# Patient Record
Sex: FEMALE | Race: WHITE | Employment: OTHER | URBAN - METROPOLITAN AREA
[De-identification: names, ages, dates, MRNs, and addresses within clinical notes are randomized per-mention and may not be internally consistent; named-entity substitution may affect disease eponyms.]

---

## 2017-04-03 PROBLEM — I48.0 PAROXYSMAL ATRIAL FIBRILLATION (HCC): Status: ACTIVE | Noted: 2017-04-03

## 2017-11-20 PROBLEM — E66.9 OBESITY (BMI 30.0-34.9): Status: ACTIVE | Noted: 2017-11-20

## 2018-08-18 ENCOUNTER — APPOINTMENT (OUTPATIENT)
Dept: GENERAL RADIOLOGY | Facility: HOSPITAL | Age: 78
DRG: 470 | End: 2018-08-18
Attending: EMERGENCY MEDICINE
Payer: MEDICARE

## 2018-08-18 ENCOUNTER — HOSPITAL ENCOUNTER (INPATIENT)
Facility: HOSPITAL | Age: 78
LOS: 3 days | Discharge: NURSING, REHAB OR LONG-TERM CARE | DRG: 470 | End: 2018-08-21
Attending: EMERGENCY MEDICINE | Admitting: HOSPITALIST
Payer: MEDICARE

## 2018-08-18 DIAGNOSIS — S72.001A CLOSED FRACTURE OF RIGHT HIP, INITIAL ENCOUNTER (HCC): Primary | ICD-10-CM

## 2018-08-18 DIAGNOSIS — S72.009A HIP FRACTURE (HCC): ICD-10-CM

## 2018-08-18 LAB
ALBUMIN SERPL-MCNC: 3.7 G/DL (ref 3.5–4.8)
ALBUMIN/GLOB SERPL: 1.2 {RATIO} (ref 1–2)
ALP LIVER SERPL-CCNC: 59 U/L (ref 55–142)
ALT SERPL-CCNC: 25 U/L (ref 14–54)
ANION GAP SERPL CALC-SCNC: 8 MMOL/L (ref 0–18)
ANTIBODY SCREEN: NEGATIVE
APTT PPP: 32 SECONDS (ref 26.1–34.6)
AST SERPL-CCNC: 30 U/L (ref 15–41)
BASOPHILS # BLD AUTO: 0.01 X10(3) UL (ref 0–0.1)
BASOPHILS NFR BLD AUTO: 0.1 %
BILIRUB SERPL-MCNC: 0.6 MG/DL (ref 0.1–2)
BUN BLD-MCNC: 16 MG/DL (ref 8–20)
BUN/CREAT SERPL: 16.5 (ref 10–20)
CALCIUM BLD-MCNC: 9.4 MG/DL (ref 8.3–10.3)
CHLORIDE SERPL-SCNC: 104 MMOL/L (ref 101–111)
CO2 SERPL-SCNC: 31 MMOL/L (ref 22–32)
CREAT BLD-MCNC: 0.97 MG/DL (ref 0.55–1.02)
EOSINOPHIL # BLD AUTO: 0.05 X10(3) UL (ref 0–0.3)
EOSINOPHIL NFR BLD AUTO: 0.6 %
ERYTHROCYTE [DISTWIDTH] IN BLOOD BY AUTOMATED COUNT: 12.7 % (ref 11.5–16)
GLOBULIN PLAS-MCNC: 3.1 G/DL (ref 2.5–4)
GLUCOSE BLD-MCNC: 158 MG/DL (ref 70–99)
HCT VFR BLD AUTO: 43.9 % (ref 34–50)
HGB BLD-MCNC: 14.5 G/DL (ref 12–16)
IMMATURE GRANULOCYTE COUNT: 0.06 X10(3) UL (ref 0–1)
IMMATURE GRANULOCYTE RATIO %: 0.7 %
INR BLD: 2.33 (ref 0.9–1.1)
LYMPHOCYTES # BLD AUTO: 2.65 X10(3) UL (ref 0.9–4)
LYMPHOCYTES NFR BLD AUTO: 29.9 %
M PROTEIN MFR SERPL ELPH: 6.8 G/DL (ref 6.1–8.3)
MCH RBC QN AUTO: 29.9 PG (ref 27–33.2)
MCHC RBC AUTO-ENTMCNC: 33 G/DL (ref 31–37)
MCV RBC AUTO: 90.5 FL (ref 81–100)
MONOCYTES # BLD AUTO: 0.59 X10(3) UL (ref 0.1–1)
MONOCYTES NFR BLD AUTO: 6.7 %
NEUTROPHIL ABS PRELIM: 5.49 X10 (3) UL (ref 1.3–6.7)
NEUTROPHILS # BLD AUTO: 5.49 X10(3) UL (ref 1.3–6.7)
NEUTROPHILS NFR BLD AUTO: 62 %
OSMOLALITY SERPL CALC.SUM OF ELEC: 300 MOSM/KG (ref 275–295)
PLATELET # BLD AUTO: 188 10(3)UL (ref 150–450)
POTASSIUM SERPL-SCNC: 3.5 MMOL/L (ref 3.6–5.1)
PSA SERPL DL<=0.01 NG/ML-MCNC: 26.3 SECONDS (ref 12.4–14.7)
RBC # BLD AUTO: 4.85 X10(6)UL (ref 3.8–5.1)
RED CELL DISTRIBUTION WIDTH-SD: 41.6 FL (ref 35.1–46.3)
RH BLOOD TYPE: NEGATIVE
SODIUM SERPL-SCNC: 143 MMOL/L (ref 136–144)
WBC # BLD AUTO: 8.9 X10(3) UL (ref 4–13)

## 2018-08-18 PROCEDURE — 93010 ELECTROCARDIOGRAM REPORT: CPT | Performed by: INTERNAL MEDICINE

## 2018-08-18 PROCEDURE — 80053 COMPREHEN METABOLIC PANEL: CPT | Performed by: EMERGENCY MEDICINE

## 2018-08-18 PROCEDURE — 85730 THROMBOPLASTIN TIME PARTIAL: CPT | Performed by: EMERGENCY MEDICINE

## 2018-08-18 PROCEDURE — 86901 BLOOD TYPING SEROLOGIC RH(D): CPT | Performed by: ORTHOPAEDIC SURGERY

## 2018-08-18 PROCEDURE — 96375 TX/PRO/DX INJ NEW DRUG ADDON: CPT

## 2018-08-18 PROCEDURE — 87641 MR-STAPH DNA AMP PROBE: CPT | Performed by: EMERGENCY MEDICINE

## 2018-08-18 PROCEDURE — 93005 ELECTROCARDIOGRAM TRACING: CPT

## 2018-08-18 PROCEDURE — 99285 EMERGENCY DEPT VISIT HI MDM: CPT

## 2018-08-18 PROCEDURE — 96374 THER/PROPH/DIAG INJ IV PUSH: CPT

## 2018-08-18 PROCEDURE — 71045 X-RAY EXAM CHEST 1 VIEW: CPT | Performed by: EMERGENCY MEDICINE

## 2018-08-18 PROCEDURE — 87640 STAPH A DNA AMP PROBE: CPT | Performed by: EMERGENCY MEDICINE

## 2018-08-18 PROCEDURE — 86900 BLOOD TYPING SEROLOGIC ABO: CPT | Performed by: ORTHOPAEDIC SURGERY

## 2018-08-18 PROCEDURE — 73502 X-RAY EXAM HIP UNI 2-3 VIEWS: CPT | Performed by: EMERGENCY MEDICINE

## 2018-08-18 PROCEDURE — 85025 COMPLETE CBC W/AUTO DIFF WBC: CPT | Performed by: EMERGENCY MEDICINE

## 2018-08-18 PROCEDURE — 85610 PROTHROMBIN TIME: CPT | Performed by: EMERGENCY MEDICINE

## 2018-08-18 PROCEDURE — 86850 RBC ANTIBODY SCREEN: CPT | Performed by: ORTHOPAEDIC SURGERY

## 2018-08-18 RX ORDER — MORPHINE SULFATE 4 MG/ML
2 INJECTION, SOLUTION INTRAMUSCULAR; INTRAVENOUS EVERY 2 HOUR PRN
Status: DISCONTINUED | OUTPATIENT
Start: 2018-08-18 | End: 2018-08-19

## 2018-08-18 RX ORDER — MORPHINE SULFATE 4 MG/ML
4 INJECTION, SOLUTION INTRAMUSCULAR; INTRAVENOUS EVERY 2 HOUR PRN
Status: DISCONTINUED | OUTPATIENT
Start: 2018-08-18 | End: 2018-08-19

## 2018-08-18 RX ORDER — SODIUM CHLORIDE 9 MG/ML
INJECTION, SOLUTION INTRAVENOUS CONTINUOUS
Status: DISCONTINUED | OUTPATIENT
Start: 2018-08-18 | End: 2018-08-21

## 2018-08-18 RX ORDER — MORPHINE SULFATE 4 MG/ML
4 INJECTION, SOLUTION INTRAMUSCULAR; INTRAVENOUS EVERY 30 MIN PRN
Status: DISCONTINUED | OUTPATIENT
Start: 2018-08-18 | End: 2018-08-19

## 2018-08-18 RX ORDER — ONDANSETRON 2 MG/ML
4 INJECTION INTRAMUSCULAR; INTRAVENOUS ONCE
Status: COMPLETED | OUTPATIENT
Start: 2018-08-18 | End: 2018-08-18

## 2018-08-18 RX ORDER — HYDROCHLOROTHIAZIDE 12.5 MG/1
12.5 CAPSULE, GELATIN COATED ORAL DAILY
Status: DISCONTINUED | OUTPATIENT
Start: 2018-08-19 | End: 2018-08-21

## 2018-08-18 RX ORDER — FLECAINIDE ACETATE 100 MG/1
100 TABLET ORAL 2 TIMES DAILY
Status: DISCONTINUED | OUTPATIENT
Start: 2018-08-18 | End: 2018-08-21

## 2018-08-18 RX ORDER — ATORVASTATIN CALCIUM 10 MG/1
10 TABLET, FILM COATED ORAL NIGHTLY
Status: DISCONTINUED | OUTPATIENT
Start: 2018-08-18 | End: 2018-08-21

## 2018-08-18 RX ORDER — LISINOPRIL 20 MG/1
20 TABLET ORAL DAILY
Status: DISCONTINUED | OUTPATIENT
Start: 2018-08-19 | End: 2018-08-21

## 2018-08-18 NOTE — ED PROVIDER NOTES
Patient Seen in: [facility] Emergency Department    History   Patient presents with:  Fall (musculoskeletal, neurologic)    Stated Complaint: fall; right hip injury    HPI    76-year-old female presents emergency department who states slipped and fe Exam    Vital signs reviewed  General appearance: Patient is alert and in moderate degree of pain  HEENT: Pupils equal react to light extraocular muscles intact no scleral icterus, mucous membranes are moist, there is no erythema or exudate in the posterio be given pain medication. Xr Chest Ap Portable  (cpt=71045)    Result Date: 8/18/2018  CONCLUSION:  No acute process.      Dictated by: Katey Turner MD on 8/18/2018 at 17:41     Approved by: Katey Turner MD            Xr Hip W Or Wo Pelvis 2 Or 3 Views

## 2018-08-18 NOTE — ED INITIAL ASSESSMENT (HPI)
Pt to ED via EMS s/p slipping on wet floor and falling. Pt c/o pain to right hip. Denies any other symptoms. Given intranasal fentanyl en route without relief.

## 2018-08-19 ENCOUNTER — APPOINTMENT (OUTPATIENT)
Dept: GENERAL RADIOLOGY | Facility: HOSPITAL | Age: 78
DRG: 470 | End: 2018-08-19
Attending: PHYSICIAN ASSISTANT
Payer: MEDICARE

## 2018-08-19 ENCOUNTER — ANESTHESIA (OUTPATIENT)
Dept: SURGERY | Facility: HOSPITAL | Age: 78
DRG: 470 | End: 2018-08-19
Payer: MEDICARE

## 2018-08-19 ENCOUNTER — ANESTHESIA EVENT (OUTPATIENT)
Dept: SURGERY | Facility: HOSPITAL | Age: 78
DRG: 470 | End: 2018-08-19
Payer: MEDICARE

## 2018-08-19 ENCOUNTER — SURGERY (OUTPATIENT)
Age: 78
End: 2018-08-19

## 2018-08-19 LAB
ATRIAL RATE: 91 BPM
BILIRUB UR QL STRIP.AUTO: NEGATIVE
CLARITY UR REFRACT.AUTO: CLEAR
COLOR UR AUTO: YELLOW
GLUCOSE UR STRIP.AUTO-MCNC: NEGATIVE MG/DL
INR BLD: 1.5 (ref 0.9–1.1)
KETONES UR STRIP.AUTO-MCNC: NEGATIVE MG/DL
LEUKOCYTE ESTERASE UR QL STRIP.AUTO: NEGATIVE
NITRITE UR QL STRIP.AUTO: NEGATIVE
P AXIS: 35 DEGREES
P-R INTERVAL: 276 MS
PH UR STRIP.AUTO: 5 [PH] (ref 4.5–8)
PROT UR STRIP.AUTO-MCNC: NEGATIVE MG/DL
PSA SERPL DL<=0.01 NG/ML-MCNC: 18.7 SECONDS (ref 12.4–14.7)
Q-T INTERVAL: 372 MS
QRS DURATION: 116 MS
QTC CALCULATION (BEZET): 457 MS
R AXIS: 23 DEGREES
RBC UR QL AUTO: NEGATIVE
SP GR UR STRIP.AUTO: 1.02 (ref 1–1.03)
T AXIS: 31 DEGREES
UROBILINOGEN UR STRIP.AUTO-MCNC: <2 MG/DL
VENTRICULAR RATE: 91 BPM

## 2018-08-19 PROCEDURE — 85610 PROTHROMBIN TIME: CPT | Performed by: ORTHOPAEDIC SURGERY

## 2018-08-19 PROCEDURE — 0SR90JA REPLACEMENT OF RIGHT HIP JOINT WITH SYNTHETIC SUBSTITUTE, UNCEMENTED, OPEN APPROACH: ICD-10-PCS | Performed by: ORTHOPAEDIC SURGERY

## 2018-08-19 PROCEDURE — 73501 X-RAY EXAM HIP UNI 1 VIEW: CPT | Performed by: PHYSICIAN ASSISTANT

## 2018-08-19 PROCEDURE — 81003 URINALYSIS AUTO W/O SCOPE: CPT | Performed by: EMERGENCY MEDICINE

## 2018-08-19 PROCEDURE — 88311 DECALCIFY TISSUE: CPT | Performed by: ORTHOPAEDIC SURGERY

## 2018-08-19 PROCEDURE — 88305 TISSUE EXAM BY PATHOLOGIST: CPT | Performed by: ORTHOPAEDIC SURGERY

## 2018-08-19 DEVICE — SELF CENTERING BI-POLAR HEAD 28MM ID 46MM OD
Type: IMPLANTABLE DEVICE | Site: HIP | Status: FUNCTIONAL
Brand: SELF CENTERING

## 2018-08-19 DEVICE — CORAIL HIP SYSTEM CEMENTLESS FEMORAL STEM 12/14 AMT 125 DEGREES KLA SIZE 12 HA COATED HIGH OFFSET COLLAR
Type: IMPLANTABLE DEVICE | Site: HIP | Status: FUNCTIONAL
Brand: CORAIL

## 2018-08-19 DEVICE — M-SPEC METAL FEMORAL HEAD 12/14 TAPER DIAMETER 28MM +5: Type: IMPLANTABLE DEVICE | Site: HIP | Status: FUNCTIONAL

## 2018-08-19 RX ORDER — DOCUSATE SODIUM 100 MG/1
100 CAPSULE, LIQUID FILLED ORAL 2 TIMES DAILY
Status: DISCONTINUED | OUTPATIENT
Start: 2018-08-19 | End: 2018-08-21

## 2018-08-19 RX ORDER — CEFAZOLIN SODIUM 1 G/3ML
INJECTION, POWDER, FOR SOLUTION INTRAMUSCULAR; INTRAVENOUS
Status: DISCONTINUED | OUTPATIENT
Start: 2018-08-19 | End: 2018-08-19 | Stop reason: HOSPADM

## 2018-08-19 RX ORDER — SODIUM PHOSPHATE, DIBASIC AND SODIUM PHOSPHATE, MONOBASIC 7; 19 G/133ML; G/133ML
1 ENEMA RECTAL ONCE AS NEEDED
Status: DISCONTINUED | OUTPATIENT
Start: 2018-08-19 | End: 2018-08-21

## 2018-08-19 RX ORDER — OXYCODONE HYDROCHLORIDE 5 MG/1
2.5 TABLET ORAL EVERY 4 HOURS PRN
Status: DISPENSED | OUTPATIENT
Start: 2018-08-19 | End: 2018-08-21

## 2018-08-19 RX ORDER — WARFARIN SODIUM 5 MG/1
5 TABLET ORAL NIGHTLY
Status: DISCONTINUED | OUTPATIENT
Start: 2018-08-19 | End: 2018-08-21

## 2018-08-19 RX ORDER — SODIUM CHLORIDE, SODIUM LACTATE, POTASSIUM CHLORIDE, CALCIUM CHLORIDE 600; 310; 30; 20 MG/100ML; MG/100ML; MG/100ML; MG/100ML
INJECTION, SOLUTION INTRAVENOUS CONTINUOUS
Status: DISCONTINUED | OUTPATIENT
Start: 2018-08-19 | End: 2018-08-19 | Stop reason: HOSPADM

## 2018-08-19 RX ORDER — MORPHINE SULFATE 4 MG/ML
1 INJECTION, SOLUTION INTRAMUSCULAR; INTRAVENOUS EVERY 2 HOUR PRN
Status: DISCONTINUED | OUTPATIENT
Start: 2018-08-19 | End: 2018-08-21

## 2018-08-19 RX ORDER — NALOXONE HYDROCHLORIDE 0.4 MG/ML
80 INJECTION, SOLUTION INTRAMUSCULAR; INTRAVENOUS; SUBCUTANEOUS AS NEEDED
Status: DISCONTINUED | OUTPATIENT
Start: 2018-08-19 | End: 2018-08-19 | Stop reason: HOSPADM

## 2018-08-19 RX ORDER — MORPHINE SULFATE 4 MG/ML
2 INJECTION, SOLUTION INTRAMUSCULAR; INTRAVENOUS EVERY 5 MIN PRN
Status: DISCONTINUED | OUTPATIENT
Start: 2018-08-19 | End: 2018-08-19 | Stop reason: HOSPADM

## 2018-08-19 RX ORDER — CEFAZOLIN SODIUM/WATER 2 G/20 ML
2 SYRINGE (ML) INTRAVENOUS EVERY 8 HOURS
Status: COMPLETED | OUTPATIENT
Start: 2018-08-19 | End: 2018-08-20

## 2018-08-19 RX ORDER — SENNOSIDES 8.6 MG
17.2 TABLET ORAL NIGHTLY
Status: DISCONTINUED | OUTPATIENT
Start: 2018-08-19 | End: 2018-08-21

## 2018-08-19 RX ORDER — DIPHENHYDRAMINE HYDROCHLORIDE 50 MG/ML
25 INJECTION INTRAMUSCULAR; INTRAVENOUS ONCE AS NEEDED
Status: ACTIVE | OUTPATIENT
Start: 2018-08-19 | End: 2018-08-19

## 2018-08-19 RX ORDER — MORPHINE SULFATE 4 MG/ML
1.5 INJECTION, SOLUTION INTRAMUSCULAR; INTRAVENOUS EVERY 2 HOUR PRN
Status: DISCONTINUED | OUTPATIENT
Start: 2018-08-19 | End: 2018-08-21

## 2018-08-19 RX ORDER — MORPHINE SULFATE 4 MG/ML
2 INJECTION, SOLUTION INTRAMUSCULAR; INTRAVENOUS EVERY 2 HOUR PRN
Status: DISCONTINUED | OUTPATIENT
Start: 2018-08-19 | End: 2018-08-21

## 2018-08-19 RX ORDER — MEPERIDINE HYDROCHLORIDE 25 MG/ML
12.5 INJECTION INTRAMUSCULAR; INTRAVENOUS; SUBCUTANEOUS AS NEEDED
Status: DISCONTINUED | OUTPATIENT
Start: 2018-08-19 | End: 2018-08-19 | Stop reason: HOSPADM

## 2018-08-19 RX ORDER — OXYCODONE HYDROCHLORIDE 5 MG/1
5 TABLET ORAL EVERY 4 HOURS PRN
Status: DISPENSED | OUTPATIENT
Start: 2018-08-19 | End: 2018-08-21

## 2018-08-19 RX ORDER — BISACODYL 10 MG
10 SUPPOSITORY, RECTAL RECTAL
Status: DISCONTINUED | OUTPATIENT
Start: 2018-08-19 | End: 2018-08-21

## 2018-08-19 RX ORDER — POTASSIUM CHLORIDE 20 MEQ/1
40 TABLET, EXTENDED RELEASE ORAL EVERY 4 HOURS
Status: COMPLETED | OUTPATIENT
Start: 2018-08-19 | End: 2018-08-19

## 2018-08-19 RX ORDER — DOCUSATE SODIUM 100 MG/1
100 CAPSULE, LIQUID FILLED ORAL 2 TIMES DAILY
Qty: 60 CAPSULE | Refills: 0 | Status: SHIPPED | OUTPATIENT
Start: 2018-08-19 | End: 2018-10-22

## 2018-08-19 RX ORDER — ONDANSETRON 2 MG/ML
4 INJECTION INTRAMUSCULAR; INTRAVENOUS AS NEEDED
Status: DISCONTINUED | OUTPATIENT
Start: 2018-08-19 | End: 2018-08-19 | Stop reason: HOSPADM

## 2018-08-19 RX ORDER — METOCLOPRAMIDE HYDROCHLORIDE 5 MG/ML
10 INJECTION INTRAMUSCULAR; INTRAVENOUS EVERY 6 HOURS PRN
Status: ACTIVE | OUTPATIENT
Start: 2018-08-19 | End: 2018-08-21

## 2018-08-19 RX ORDER — ACETAMINOPHEN 325 MG/1
650 TABLET ORAL 4 TIMES DAILY
Status: DISPENSED | OUTPATIENT
Start: 2018-08-19 | End: 2018-08-21

## 2018-08-19 RX ORDER — DIPHENHYDRAMINE HYDROCHLORIDE 50 MG/ML
12.5 INJECTION INTRAMUSCULAR; INTRAVENOUS EVERY 4 HOURS PRN
Status: DISCONTINUED | OUTPATIENT
Start: 2018-08-19 | End: 2018-08-21

## 2018-08-19 RX ORDER — TRAMADOL HYDROCHLORIDE 50 MG/1
50 TABLET ORAL EVERY 6 HOURS PRN
Status: DISCONTINUED | OUTPATIENT
Start: 2018-08-19 | End: 2018-08-21

## 2018-08-19 RX ORDER — POLYETHYLENE GLYCOL 3350 17 G/17G
17 POWDER, FOR SOLUTION ORAL DAILY PRN
Status: DISCONTINUED | OUTPATIENT
Start: 2018-08-19 | End: 2018-08-21

## 2018-08-19 RX ORDER — ENOXAPARIN SODIUM 100 MG/ML
40 INJECTION SUBCUTANEOUS DAILY
Status: DISCONTINUED | OUTPATIENT
Start: 2018-08-19 | End: 2018-08-21

## 2018-08-19 RX ORDER — HYDROCODONE BITARTRATE AND ACETAMINOPHEN 10; 325 MG/1; MG/1
1 TABLET ORAL EVERY 4 HOURS PRN
Qty: 80 TABLET | Refills: 0 | Status: SHIPPED | OUTPATIENT
Start: 2018-08-19 | End: 2019-04-29 | Stop reason: ALTCHOICE

## 2018-08-19 RX ORDER — ONDANSETRON 2 MG/ML
4 INJECTION INTRAMUSCULAR; INTRAVENOUS EVERY 4 HOURS PRN
Status: ACTIVE | OUTPATIENT
Start: 2018-08-19 | End: 2018-08-21

## 2018-08-19 RX ORDER — TIZANIDINE 2 MG/1
2 TABLET ORAL 3 TIMES DAILY PRN
Status: DISCONTINUED | OUTPATIENT
Start: 2018-08-19 | End: 2018-08-21

## 2018-08-19 RX ORDER — OXYCODONE HYDROCHLORIDE 10 MG/1
10 TABLET ORAL EVERY 4 HOURS PRN
Status: DISPENSED | OUTPATIENT
Start: 2018-08-19 | End: 2018-08-21

## 2018-08-19 RX ORDER — SODIUM CHLORIDE 9 MG/ML
INJECTION, SOLUTION INTRAVENOUS CONTINUOUS
Status: DISCONTINUED | OUTPATIENT
Start: 2018-08-19 | End: 2018-08-21

## 2018-08-19 RX ORDER — DIPHENHYDRAMINE HCL 25 MG
25 CAPSULE ORAL EVERY 4 HOURS PRN
Status: DISCONTINUED | OUTPATIENT
Start: 2018-08-19 | End: 2018-08-21

## 2018-08-19 NOTE — OPERATIVE REPORT
PRE-OP DX:  RIGHT FEMORAL NECK FRACTURE  POST-OP DX:  SAME  PROCEDURE:RIGHT HIP HEMIARTHROPLASTY  SURGEON:  Heidi Kruger MD  FIRST ASSIST: LIZ Sotelo  ANESTHESIA:  GENERAL  ZIX:251AG  COMPLICATIONS:  NONE  SPECIMEN:  FEMORAL HEAD  DRAIN: NONE FEMORAL NECK AND HEAD WERE PLACED. HIP WAS REDUCED AND TAKEN THROUGH ROM. STABILITY TO ANTERIOR STRESS AND POSTERIOR STRESS WERE SAFE. LEG LENGTH APPEARED TO BE SIMILAR. ALL THE TRIAL IMPLANTS WERE REMOVED. WOUND WAS IRRIGATED COPIOUSLY.   HEMOSTASIS W

## 2018-08-19 NOTE — ANESTHESIA PREPROCEDURE EVALUATION
PRE-OP EVALUATION    Patient Name: Claretha Leyden    Pre-op Diagnosis: Hip fracture ([address] Climmie Sport    Procedure(s):  HIP HEMIARTHROPLASTY/ BIPOLAR, RIGHT    Surgeon(s) and Role:     Priya Ho MD - Primary    Pre-op vitals reviewed.   Temp: 98.6 °F (3 multivitamin Oral Tab Take 1 tablet by mouth daily. Disp:  Rfl:    Calcium Carbonate-Vitamin D 600-400 MG-UNIT Oral Tab Take 1 tablet by mouth daily. Disp:  Rfl:    Glucosamine-Chondroit-Vit C-Mn (GLUCOSAMINE CHONDR 500 COMPLEX OR) Take by mouth daily.  D REPLACEMENT SURGERY Left      Comment: Dr. Pedrito Gilbert  2007: Kenneth Ana Cristina ABD HYSTERECTOMY      Comment: BSO too  age 20: TONSILLECTOMY      Comment: incidental with choly     Smoking status: Never Smoker    Smokeless tobacco: Never Used    Alcohol use Yes    Co

## 2018-08-19 NOTE — OCCUPATIONAL THERAPY NOTE
OT Order Received from [facility] protocol, pt to have surgery today for right hip rosario, OT will place ON HOLD at this time, please re-order after surgery.

## 2018-08-19 NOTE — PLAN OF CARE
Left message through [facility] answering service for Dr. Arianna Parmar, on call for  regarding the patient's need to be seen prior to an 8 am surgery with Dr. Sofia Godoy. Dr. Arianna Parmar stated that he will text a [facility] hospitalist regarding this matter.

## 2018-08-19 NOTE — CONSULTS
[address] Patient Status:  Emergency    1940 MRN [id number]   Location [address] [facility] Attending Rashi Clemens MD   Hosp Day # 0 PCP Corazon Carrera DO   CC: right hip pain    History o admission)    Review of Systems:  Pertinent items are noted in HPI. Physical Exam:  General: Awake and cooperative. No apparent significant distress.   Vital Signs:  BP (!) 162/73   Pulse 96   Temp 97 °F (36.1 °C) (Temporal)   Resp 17   Ht 5' 5\" (1.651 PE, nerve and blood vessel injuries, further fractures, dislocation, leg length discrepancies, chronic pain and limping, blood transfusions, possible revision surgeries, anesthesia and other medical complications including death explained.   All questions w

## 2018-08-19 NOTE — PROGRESS NOTES
NURSING TRANSFER NOTE      Patient transferred from PACU via BED  Oriented to room. Safety precautions initiated. Bed in low position. Call light in reach.

## 2018-08-19 NOTE — ED NOTES
Dr. Gonzalez Child at bedside to see patient. Report given to JERMAINE Campo and informed that surgery is in the morning at 0800. Patient states she is comfortable at this time and is refusing any pain meds. Await transport to room 371.

## 2018-08-19 NOTE — CONSULTS
Pre-op eval:    Patient is a 77year old female with PMH sig for HTN, HL, Afib who presents after mechanical fall. Slipped on water and landed on her hip. Denies any LH, dizziness prior. No recent CP or SOB.  She is on coumadin for hx of Afib - was given Vi

## 2018-08-19 NOTE — ANESTHESIA POSTPROCEDURE EVALUATION
[address] [facility] Patient Status:  Inpatient   Age/Gender 77year old female MRN [id number]   Location [address] [facility] Attending Galo Vidal, [address] [facility] Se Day # 1 PCP Giovanni De La Cruz DO       Anesthesia Post-op Note

## 2018-08-19 NOTE — PLAN OF CARE
Patient admitted to 371 via cart from emergency department. Accompanied by transporter and spouse. Patient transferred from cart to bed with assistance of 4 plus slide board. Patient is alert and oriented to person, place, time and situation.   Oriented t

## 2018-08-19 NOTE — H&P
[facility] Hospitalist History and Physical      CC: Fall    PCP: Killian Castillo DO    History of Present Illness: Patient is a 77year old female with PMH sig for HTN, HL, PAF on coumadin admitted wit hip pain after slipping on wet floor and falling on the hip. acetaminophen  650 mg Oral QID   • Potassium Chloride ER  40 mEq Oral Q4H   • lisinopril  20 mg Oral Daily   • Flecainide Acetate  100 mg Oral BID   • hydrochlorothiazide  12.5 mg Oral Daily   • atorvastatin  10 mg Oral Nightly     Continuous Infusions: 08/18/2018   ALT 25 08/18/2018   PTT 32.0 08/18/2018   INR 1.50 08/19/2018   PTP 18.7 08/19/2018       Above labs reviewed.       Assessment/Plan:   Principal Problem:    Closed fracture of right hip, initial encounter [facility])  Active Problems:    Closed frac

## 2018-08-19 NOTE — PLAN OF CARE
Spoke to Dr. Merari Welsh regarding Dr. Patel Billing taking patient to OR at 0800. Dr. Merari Welsh stated that she would review chart and place any necessary orders. Agreeable to administration of FFP if am PT/INR is 1.5 or greater.   At this time, no further consultat

## 2018-08-20 LAB
ERYTHROCYTE [DISTWIDTH] IN BLOOD BY AUTOMATED COUNT: 12.6 % (ref 11.5–16)
HCT VFR BLD AUTO: 38.4 % (ref 34–50)
HGB BLD-MCNC: 12.3 G/DL (ref 12–16)
INR BLD: 1.27 (ref 0.9–1.1)
MCH RBC QN AUTO: 29.8 PG (ref 27–33.2)
MCHC RBC AUTO-ENTMCNC: 32 G/DL (ref 31–37)
MCV RBC AUTO: 93 FL (ref 81–100)
PLATELET # BLD AUTO: 150 10(3)UL (ref 150–450)
POTASSIUM SERPL-SCNC: 4.2 MMOL/L (ref 3.6–5.1)
PSA SERPL DL<=0.01 NG/ML-MCNC: 16.4 SECONDS (ref 12.4–14.7)
RBC # BLD AUTO: 4.13 X10(6)UL (ref 3.8–5.1)
RED CELL DISTRIBUTION WIDTH-SD: 43.3 FL (ref 35.1–46.3)
WBC # BLD AUTO: 12.9 X10(3) UL (ref 4–13)

## 2018-08-20 PROCEDURE — 85027 COMPLETE CBC AUTOMATED: CPT | Performed by: ORTHOPAEDIC SURGERY

## 2018-08-20 PROCEDURE — 84132 ASSAY OF SERUM POTASSIUM: CPT | Performed by: HOSPITALIST

## 2018-08-20 PROCEDURE — 97530 THERAPEUTIC ACTIVITIES: CPT

## 2018-08-20 PROCEDURE — 85610 PROTHROMBIN TIME: CPT | Performed by: HOSPITALIST

## 2018-08-20 PROCEDURE — 97165 OT EVAL LOW COMPLEX 30 MIN: CPT

## 2018-08-20 PROCEDURE — 97161 PT EVAL LOW COMPLEX 20 MIN: CPT

## 2018-08-20 RX ORDER — HYDROCODONE BITARTRATE AND ACETAMINOPHEN 10; 325 MG/1; MG/1
1 TABLET ORAL EVERY 4 HOURS PRN
Status: DISCONTINUED | OUTPATIENT
Start: 2018-08-21 | End: 2018-08-21

## 2018-08-20 RX ORDER — HYDROCODONE BITARTRATE AND ACETAMINOPHEN 5; 325 MG/1; MG/1
1 TABLET ORAL EVERY 4 HOURS PRN
Status: DISCONTINUED | OUTPATIENT
Start: 2018-08-21 | End: 2018-08-21

## 2018-08-20 RX ORDER — HYDROCODONE BITARTRATE AND ACETAMINOPHEN 10; 325 MG/1; MG/1
2 TABLET ORAL EVERY 4 HOURS PRN
Status: DISCONTINUED | OUTPATIENT
Start: 2018-08-21 | End: 2018-08-21

## 2018-08-20 RX ORDER — VITS A,C,E/LUTEIN/MINERALS 300MCG-200
1 TABLET ORAL 2 TIMES DAILY
Status: DISCONTINUED | OUTPATIENT
Start: 2018-08-20 | End: 2018-08-21

## 2018-08-20 NOTE — PHYSICAL THERAPY NOTE
PHYSICAL THERAPY HIP EVALUATION - INPATIENT     Room Number: 506/911-I  Evaluation Date: 8/20/2018  Type of Evaluation: Initial  Physician Order: PT Eval and Treat    Presenting Problem: S/p Right hip Hemiarthroplasty on 08/19/18  Reason for Therapy: [facility] to this fall. Scheduled for right TKA in September 2018. , daughter & grand daughter will be able to assist as needed during recovery @ home.     SUBJECTIVE  \"My  will help me,\" Patient was participatory though limited due to pain @ surgical bed to a chair (including a wheelchair)?: A Lot   -   Need to walk in hospital room?: A Lot   -   Climbing 3-5 steps with a railing?: Total       AM-PAC Score:  Raw Score: 11   PT Approx Degree of Impairment Score: 72.57%   Standardized Score (AM-PAC Scale session/findings; All patient questions and concerns addressed;SCDs in place; Family present; Discussed recommendations with /    ASSESSMENT   Patient is a 77year old female admitted on 8/18/2018 for S/p Right hip Hemiarthroplasty on (Obs): Daily  Number of Visits to Meet Established Goals: 5      CURRENT GOALS  Goal #1  Patient is able to demonstrate supine - sit EOB @ level: supervision     Goal #2  Patient is able to demonstrate transfers Sit to/from Stand at assistance level: super

## 2018-08-20 NOTE — PROGRESS NOTES
Orthopedic surgery progress note    Wolf Wolff Patient Status:  Inpatient    1940 MRN [id number]   [facility] 3SW-A Attending Eliana Woodard MD   Hosp Day # 2 PCP Idania Harden DO       Subjective:  Right hip pain with movement.

## 2018-08-20 NOTE — PLAN OF CARE
PAIN - ADULT    • Verbalizes/displays adequate comfort level or patient's stated pain goal Progressing        Right hip with Aquacel dressing in place and intact. Denies having numbness or tingling to RLE.   Patient premedicated with Oxycontin 5 mg 1 table

## 2018-08-20 NOTE — CM/SW NOTE
08/20/18 1500   CM/SW Screening   Referral Source    Information Source Chart review   Patient's Mental Status Alert;Oriented   Patient's [address]   Number of Levels in Home 1   Patient lives with Spouse   Patient Status Prior

## 2018-08-20 NOTE — OCCUPATIONAL THERAPY NOTE
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  Increased time was required due to Score:   Score: 13  Approx Degree of Impairment: 63.03%  Standardized Score (AM-PAC Scale): 32.03  CMS Modifier (G-Code): CL    FUNCTIONAL TRANSFER ASSESSMENT  Supine to Sit : Not tested  Sit to Stand: Dependent assistance (mod x2)    Skilled Therapy Provid and incorporation into ADLs, decreased knowledge of adaptive techniques.  These deficits impact the patient’s ability to participate in lower body dressing/bathing, toileting, toilet transfers, bed mobility, functional mobility, instrumental activities of d Established Goals: 5    ADL GOALS   Patient will perform Lower Body Dressing with supervision  Patient will perform Toileting with supervision    FUNCTIONAL TRANSFER GOALS   Patient will transfer Supine to Sit with supervision  Patient will transfer Sit to

## 2018-08-21 VITALS
OXYGEN SATURATION: 98 % | HEART RATE: 79 BPM | DIASTOLIC BLOOD PRESSURE: 41 MMHG | WEIGHT: 205 LBS | SYSTOLIC BLOOD PRESSURE: 115 MMHG | TEMPERATURE: 99 F | BODY MASS INDEX: 34.16 KG/M2 | RESPIRATION RATE: 20 BRPM | HEIGHT: 65 IN

## 2018-08-21 LAB
ERYTHROCYTE [DISTWIDTH] IN BLOOD BY AUTOMATED COUNT: 12.6 % (ref 11.5–16)
HCT VFR BLD AUTO: 35.4 % (ref 34–50)
HGB BLD-MCNC: 11.5 G/DL (ref 12–16)
INR BLD: 1.41 (ref 0.9–1.1)
MCH RBC QN AUTO: 30.7 PG (ref 27–33.2)
MCHC RBC AUTO-ENTMCNC: 32.5 G/DL (ref 31–37)
MCV RBC AUTO: 94.4 FL (ref 81–100)
PLATELET # BLD AUTO: 122 10(3)UL (ref 150–450)
PSA SERPL DL<=0.01 NG/ML-MCNC: 17.8 SECONDS (ref 12.4–14.7)
RBC # BLD AUTO: 3.75 X10(6)UL (ref 3.8–5.1)
RED CELL DISTRIBUTION WIDTH-SD: 43.6 FL (ref 35.1–46.3)
WBC # BLD AUTO: 11.4 X10(3) UL (ref 4–13)

## 2018-08-21 PROCEDURE — 97530 THERAPEUTIC ACTIVITIES: CPT

## 2018-08-21 PROCEDURE — 97116 GAIT TRAINING THERAPY: CPT

## 2018-08-21 PROCEDURE — 85027 COMPLETE CBC AUTOMATED: CPT | Performed by: ORTHOPAEDIC SURGERY

## 2018-08-21 PROCEDURE — 85610 PROTHROMBIN TIME: CPT | Performed by: HOSPITALIST

## 2018-08-21 RX ORDER — ENOXAPARIN SODIUM 100 MG/ML
40 INJECTION SUBCUTANEOUS DAILY
Qty: 12 ML | Refills: 0 | Status: SHIPPED | OUTPATIENT
Start: 2018-08-22 | End: 2018-10-01 | Stop reason: ALTCHOICE

## 2018-08-21 NOTE — PROGRESS NOTES
[facility] Hospitalist Progress Note                                                                   [address] [facility]  5/27/1940    SUBJECTIVE:    No CP/SOB    OBJECTIVE:  Temp:  [97.8 °F (36.6 °C) HCl, Metoclopramide HCl, Prochlorperazine Edisylate, diphenhydrAMINE **OR** DiphenhydrAMINE HCl, TraMADol HCl, TiZANidine HCl, morphINE sulfate **OR** morphINE sulfate **OR** morphINE sulfate, oxyCODONE HCl **OR** oxyCODONE HCl **OR** oxyCODONE HCl    Asse

## 2018-08-21 NOTE — CM/SW NOTE
08/21/18 1500   Discharge disposition   Expected discharge disposition Skilled Nurs   Name of Facillity/Home Care/Hospice ([address] [address])   Patient is Discharged to a [facility] [place] Yes   Discharge transportation [employer]

## 2018-08-21 NOTE — PROGRESS NOTES
Patient discharge to rehab facility this afternoon. Patient provided with a copy of discharge paperwork and reviewed prior to depart. All belongings collected and sent at this time.    norco and lovenox script provided to rehab facility in transfer paperw

## 2018-08-21 NOTE — PHYSICAL THERAPY NOTE
PHYSICAL THERAPY TREATMENT NOTE - INPATIENT    Room Number: 785/380-S     Session: 1   Number of Visits to Meet Established Goals: 5    Presenting Problem: S/p Right hip Hemiarthroplasty on 08/19/18    Problem List  Principal Problem:    Closed fracture o TOLERANCE  O2 Saturation: 3%  O2 Device: Nasal cannula  Liters of O2:  94    AM-PAC '6-Clicks' INPATIENT SHORT FORM - BASIC MOBILITY  How much difficulty does the patient currently have. ..  -   Turning over in bed (including adjusting bedclothes, sheets an Patient End of Session: In bed;Needs met;Call light within reach;RN aware of session/findings; All patient questions and concerns addressed;SCDs in place; Ice applied; Discussed recommendations with /    ASSESSMENT   Patient is a 8/20/2018,Goals in progress 08/21/18

## 2018-08-21 NOTE — PROGRESS NOTES
[address] [facility] Patient Status:  Inpatient    1940 MRN [id number]   [facility] 3SW-A Attending Bruce Griffin MD   Hosp Day # 3 PCP Ayaan Salazar DO         S:  Patient doing better today.  Having some hip soreness

## 2018-08-21 NOTE — CM/SW NOTE
Informed by RN that pt can d/c today at [address] [facility] but will need ambulance transport with O2. [facility] accepted transport as above. SW spoke with Nadine Rangel at [facility] [phone]. Pt is accepted with above d/c time.     Updated pt re: a

## 2018-08-21 NOTE — PLAN OF CARE
Impaired Functional Mobility    • Achieve highest/safest level of mobility/gait Progressing          MUSCULOSKELETAL - ADULT    • Return mobility to safest level of function Progressing    • Maintain proper alignment of affected body part Progressing

## 2018-08-21 NOTE — CM/SW NOTE
Spoke with La Chand in admissions at [address] [place] [address] that pt is accepted to a private room. Facility ready to accept when medically cleared for d/c. Met with pt and  re: above. Pt agrees with plan for rehab at [facility].   Sh

## 2018-08-21 NOTE — PROGRESS NOTES
[facility] Hospitalist Progress Note                                                                   [address] [facility]  5/27/1940    SUBJECTIVE:    Denies CP/SOB    OBJECTIVE:  Temp:  [98.2 °F (36.8 sulfate **OR** morphINE sulfate **OR** morphINE sulfate    Assessment/Plan:  Principal Problem:    Closed fracture of right hip, initial encounter [facility])  Active Problems:    Closed fracture of right hip ([address]      # right femoral neck fracture s/p right hi

## 2018-08-22 NOTE — PROGRESS NOTES
Acute Pain Service    Post Op Day 2 Ortho Note     Patient states Orren Rides is working well to manage pain; denies itching/nausea/dizziness. Plan for dc to [facility] later today. No further recommendations. Will sign-off.       Plan discussed with/by: Pain Nurse -

## 2018-08-29 NOTE — DISCHARGE SUMMARY
General Medicine Discharge Summary     Patient ID:  Beverly Bowden  77year old  5/27/1940    Admit date: 8/18/2018    Discharge date and time: 8/21/2018  4:15 PM     Attending Physician: Zoey att. providers tolerated  - Lovenox proph dose for DVT proph until INR > 2     # PAF  - In NSR, tele  - Vit K given on admit per Ortho prior to OR  - Restart coumadin 8/19  - Monitor INR  - Home flecainide      # HTN  - ACEI and HCTZ OK     Hypoxia  -Likely atelectasis p mg total) by mouth nightly., Normal, Disp-90 tablet, R-1    Benazepril HCl 40 MG Oral Tab  1/2 tab bid, Normal, Disp-90 tablet, R-3          Activity: per ortho  Diet: cardiac diet  Wound Care: as directed  Code Status: Full Code      Exam on day of discha

## 2018-09-05 PROBLEM — Z96.649 S/P HIP HEMIARTHROPLASTY: Status: ACTIVE | Noted: 2018-09-05

## 2018-10-22 PROCEDURE — 87086 URINE CULTURE/COLONY COUNT: CPT | Performed by: FAMILY MEDICINE

## 2019-07-27 PROBLEM — M48.02 SPINAL STENOSIS OF CERVICAL REGION: Status: ACTIVE | Noted: 2019-07-27

## 2020-01-21 PROBLEM — S72.001A CLOSED FRACTURE OF RIGHT HIP (HCC): Status: RESOLVED | Noted: 2018-08-18 | Resolved: 2020-01-21

## 2020-01-21 PROBLEM — S72.001A CLOSED FRACTURE OF RIGHT HIP, INITIAL ENCOUNTER (HCC): Status: RESOLVED | Noted: 2018-08-18 | Resolved: 2020-01-21

## 2020-02-04 PROBLEM — I82.409 DEEP VEIN THROMBOSIS (DVT) OF LOWER EXTREMITY, UNSPECIFIED CHRONICITY, UNSPECIFIED LATERALITY, UNSPECIFIED VEIN (HCC): Status: ACTIVE | Noted: 2020-02-04

## 2020-02-04 PROBLEM — I82.409 DEEP VEIN THROMBOSIS (DVT) OF LOWER EXTREMITY, UNSPECIFIED CHRONICITY, UNSPECIFIED LATERALITY, UNSPECIFIED VEIN (HCC): Status: RESOLVED | Noted: 2020-02-04 | Resolved: 2020-02-04

## 2021-10-12 PROBLEM — E66.01 OBESITY, MORBID (HCC): Status: ACTIVE | Noted: 2021-10-12

## (undated) DEVICE — CHLORAPREP 26ML APPLICATOR

## (undated) DEVICE — Device: Brand: PLUMEPEN

## (undated) DEVICE — SHEET,DRAPE,70X100,STERILE: Brand: MEDLINE

## (undated) DEVICE — KENDALL SCD EXPRESS SLEEVES, KNEE LENGTH, MEDIUM: Brand: KENDALL SCD

## (undated) DEVICE — TOTAL HIP CDS: Brand: MEDLINE INDUSTRIES, INC.

## (undated) DEVICE — REM POLYHESIVE ADULT PATIENT RETURN ELECTRODE: Brand: VALLEYLAB

## (undated) DEVICE — FAN SPRAY KIT: Brand: PULSAVAC®

## (undated) DEVICE — STERILE POLYISOPRENE POWDER-FREE SURGICAL GLOVES: Brand: PROTEXIS

## (undated) DEVICE — DRAIN RELIAVAC W/DRN MED 1/8

## (undated) DEVICE — DRESSING AQUACEL AG 3.5 X 10

## (undated) DEVICE — SUTURE VICRYL 2-0 CP-1

## (undated) DEVICE — MLPD DISPOSABLE PAD (6' ROLL) 3 ROLLS: Brand: SCHAERER MEDICAL USA

## (undated) DEVICE — PILLOW ABDUCTION HIP SM

## (undated) DEVICE — 3M™ MICROFOAM™ TAPE 1528-4: Brand: 3M™ MICROFOAM™

## (undated) DEVICE — Device: Brand: STABLECUT®

## (undated) DEVICE — SUTURE VICRYL 1 OS-6

## (undated) DEVICE — SOL  .9 3000ML

## (undated) DEVICE — DRAPE,U/SHT,SPLIT,FILM,60X84,STERILE: Brand: MEDLINE

## (undated) DEVICE — GOWN SURG AERO CHROME XXL

## (undated) DEVICE — STERILE HOOK LOCK LATEX FREE ELASTIC BANDAGE 6INX5YD: Brand: HOOK LOCK™

## (undated) NOTE — LETTER
[address] [address]  [place], [address] [facility]    Consent for Operation  Date: __________________                                Time: _______________    1.  I authorize the performance upon [facility] the following operation:  Procedure( procedure has been videotaped, the surgeon will obtain the original videotape. The hospital will not be responsible for storage or maintenance of this tape.     6. For the purpose of advancing medical education, I consent to the admittance of observers to t STATEMENTS REQUIRING INSERTION OR COMPLETION WERE FILLED IN.     Signature of Patient:   ___________________________    When the patient is a minor or mentally incompetent to give consent:  Signature of person authorized to consent for patient: ____________

## (undated) NOTE — IP AVS SNAPSHOT
[address]            (For Outpatient Use Only) Initial Admit Date: 8/18/2018   Inpt/Obs Admit Date: Inpt: 8/18/18 / Obs: N/A   Discharge Date:    Juan Carlos Steele:  [de-identified]   MRN: [de-identified]   CSN: [id number]        ENCOUNTER  Patient Subscriber Name:  Saundra Bill :    Subscriber ID:  Pt Rel to Subscriber:    Hospital Account Financial Class: Medicare    2018

## (undated) NOTE — IP AVS SNAPSHOT
Patient Demographics     Address  [address] [place] [address] Edgar [place] [address] Phone  [phone] [facility]  [phone] [facility] E-mail Address  [email or web address]      Emergency Contact(s)     Name Relation Home Work [address] Spouse [phone] showering. Pat dry with a CLEAN TOWEL if necessary and cover incision with new Medipore/coverlet. For other types of dressings, follow surgeon’s orders. [address] [facility] [place]            Driving  ? Do not drive until cleared by surgeon.  This is usually Aspirin)  ? Pill or shot form depending on what your physician orders. ? IF placed on Coumadin, you may also need lab work done for monitoring. ? You will bleed easier and bruise easier while on these medications. ?  Usually you will be on a blood th ? Contact physician if discomfort does not respond to pain medication. Body changes  ? Constipation is common with the use of narcotics. ? Eat fiber rich foods and drink plenty of fluids. ?  Use stool softeners such as Colace or Senakot while on narc ? LATONIA HOSE – IF ordered by your surgeon, wear these during the day and off at night. Surgeon will tell you when you don’t need them anymore. Notify your surgeon if you notice any of the following signs  ? Separation of incision line.   ? Increased r Surgeon or PCP visit, request they fill out the form, then go to [facility] (only time you do not wait in a long line there). Some [facility] offices provide the same service.  (Steven Vaughan have this service; if you live in another [facility], yo hydrochlorothiazide 12.5 MG Tabs  Commonly known as:  HYDRODIURIL      Take 1 tablet (12.5 mg total) by mouth daily.    Elin Wood MD         HYDROcodone-acetaminophen  MG Tabs  Commonly known as:  NORCO      Take 1 tablet by mouth every 4 [id number] OCUVITE-LUTEIN (OCUVITE - EYE VITAMIN) tab 1 tablet 08/21/18 0833 Given      [id number] PEG 3350 (MIRALAX) powder packet 17 g 08/21/18 0833 Given      [id number] Senna (SENOKOT) tab TABS 17.2 mg 08/20/18 2033 Given      [id number] Warfarin Sodium ( Ordering provider:  Aden Paez MD  08/20/18 2301 Resulting lab:  [facility]    Specimen Information    Type Source Collected On   Blood — 08/21/18 7633          Components    Component Value Reference Range Flag Lab   WBC 11.4 4.0 - 13.0 x10(3) uL — [place] Past Medical Hx:  Past Medical History:   Diagnosis Date   • Essential hypertension mid 2000s   • Hyperlipidemia LDL goal <70 late 2000s   • Impaired glucose tolerance late 2000s   • Osteoarthritis    • Paroxysmal atrial fibrillation (HCC)    • Preop cardi PRN: sodium chloride 0.9%, PEG 3350, magnesium hydroxide, bisacodyl, FLEET ENEMA, ondansetron HCl, Metoclopramide HCl, Prochlorperazine Edisylate, DiphenhydrAMINE HCl, diphenhydrAMINE **OR** DiphenhydrAMINE HCl, TraMADol HCl, TiZANidine HCl, morphINE sulfa - IV narcotics prn transition to PO as tolerated  - Lovenox proph dose for DVT proph until INR > 2    # PAF  - In NSR, tele  - Vit K given on admit per Ortho prior to OR  - Restart coumadin tonight  - Monitor INR  - Home flecainide     # HTN  - ACEI and HC Labs and EKG reviewed- NSR. INR 1.5 today after Vit K. EKG w/o acute ischemic change. She has no s/s of active cardiac disease. Has some limitation to activity due to knee pain but no exertional symptoms.     No further cardiac testing recommending prior No date: HC VAGINAL DELIVERY LEVEL I      Comment:  x 4 before age 18  0: KNEE REPLACEMENT SURGERY Left      Comment: Dr. Adriana Xavier  2007: Linden Trujillo ABD HYSTERECTOMY      Comment: BSO too  age 20: TONSILLECTOMY      Comment: incidental with semaj PEDRO CMS Modifier (G-Code): CK    FUNCTIONAL ABILITY STATUS  Gait Assessment   Gait Assistance: Maximum assistance (Actual assist - min to mod)  Distance (ft): 50 ft - needed O2 via nc @ 3 lt  Assistive Device: Rolling walker  Pattern: R Decreased stance time ( patient is demonstrating a 57.7% degree of impairment in mobility.  Research supports that patients with this level of impairment may benefit from Sub Acute Rehab with goal of achieving Modified independence to supervision level of mobility prior to d/c victor manuel fall by slipping on wet floor. Landed on Right hip Sustained right hip Fx.   Underwent S/p Right hip Hemiarthroplasty on 08/19/18    Problem List  Principal Problem:    Closed fracture of right hip, initial encounter [facility])  Active Problems:    Closed fractur Patient self-stated goal is to be able to walk pain free & go home.     OBJECTIVE  Precautions: MICKEY - anterior;MICKEY - posterior (Global)  Fall Risk: High fall risk    WEIGHT BEARING RESTRICTION  Weight Bearing Restriction: R lower extremity        R Lower Ex FUNCTIONAL ABILITY STATUS  Gait Assessment   Gait Assistance: Dependent assistance (Actual assist - mod & chair to follow close)  Distance (ft): 5 ft x 2,Rigth knee pain limited mobility. Used Ace wrap on right knee since patient uses prefabricated elastic factors impacting therapy include Spinal stenosis,Afib, HL, HTN,Left TKA in 2011.   In this PT evaluation, the patient presents with the following impairments c/o pain in right hip @ surgical site, Right knee pain & instability, decreased strength in right Patient is able to ambulate 50 feet with assistive device at assistance level: modified independent    Goal #4    Patient will negotiate 1 stairs/one curb w/ assistive device and supervision   Goal #5    Patient verbalizes and/or demonstrates all precaut • Impaired glucose tolerance late 2000s   • Osteoarthritis    • Paroxysmal atrial fibrillation ([facility]    • Preop cardiovascular exam 2011    chemical stress negative   • Spinal stenosis[BW.2]        Past Surgical History[BW.1]  Past Surgical History:  No da Overall Cognitive Status:  WFL - within functional limits[BW.3]    Communication:[BW.1] WFL[BW.3]    Behavioral/Emotional/Social:[BW.1] WFL[BW.3]    RANGE OF MOTION AND STRENGTH ASSESSMENT  Upper extremity ROM is within functional limits     Upper extremit increased time and cueing for sequencing required, patient then becoming dizzy each demo and needing to sit; BP each demo WFL, SPO2 in mid 90s throughout on 4L supplemental O2, patient required extended seated rest breaks after each demo however motivated the activity limitations identified by the AM-PAC \"6 clicks\". Subacute rehab is recommended for 19-21 days.  After this period of rehabilitation patient should achieve supervision to Mod I level in all BADLs and functional transfers.[BW.3]    The patient BW.3 - Chrissy Abel, OT on 8/20/2018  3:10 PM  BW.4 - Chrissy Abel, OT on 8/20/2018  3:11 PM  BW.5 - Chrissy Abel OT on 8/20/2018  3:14 PM               Occupational Therapy Note signed by Zoe Ellis OT at 8/19/2018  7:35 AM  Vers Zoster Vaccine Live (Zostavax) 01/01/12       Future Appointments        Provider Geovanni Moseley    9/13/2018 10:00 AM Tyrese Erna DO [address]

## (undated) NOTE — LETTER
[facility]  Reji Adanhussain 60 [id number] [facility], [address] [place] [address]    Consent for Operation    Date: __________________    Time: _______________    1.  I authorize the performance upon Chetna Moreno the following operation:    Procedure(s):  HIP HEMIARTHROPLASTY/ procedure has been videotaped, the surgeon will obtain the original videotape. The hospital will not be responsible for storage or maintenance of this tape.     6. For the purpose of advancing medical education, I consent to the admittance of observers to t STATEMENTS REQUIRING INSERTION OR COMPLETION WERE FILLED IN.     Signature of Patient:   ___________________________    When the patient is a minor or mentally incompetent to give consent:  Signature of person authorized to consent for patient: ____________ drugs/illegal medications). Failure to inform my anesthesiologist about these medicines may increase my risk of anesthetic complications. · If I am allergic to anything or have had a reaction to anesthesia before.     3. I understand how the anesthesia med I have discussed the procedure and information above with the patient (or patient’s representative) and answered their questions. The patient or their representative has agreed to have anesthesia services.     _______________________________________________

## (undated) NOTE — LETTER
[facility] [address], [address] [place] [address]    Consent for Anesthesia   1.    Esperanza Bette agree to be cared for by an anesthesiologist, who is specially trained to monitor me and give me medicine to put me to sleep or keep me comfortab vision, nerves, or muscles and in extremely rare instances death. 5. My doctor has explained to me other choices available to me for my care (alternatives).   6. Pregnant Patients (“epidural”):  I understand that the risks of having an epidural (medicine g